# Patient Record
Sex: FEMALE | Race: WHITE | NOT HISPANIC OR LATINO
[De-identification: names, ages, dates, MRNs, and addresses within clinical notes are randomized per-mention and may not be internally consistent; named-entity substitution may affect disease eponyms.]

---

## 2023-06-12 ENCOUNTER — NON-APPOINTMENT (OUTPATIENT)
Age: 53
End: 2023-06-12

## 2023-08-21 PROBLEM — Z00.00 ENCOUNTER FOR PREVENTIVE HEALTH EXAMINATION: Status: ACTIVE | Noted: 2023-08-21

## 2023-08-31 ENCOUNTER — TRANSCRIPTION ENCOUNTER (OUTPATIENT)
Age: 53
End: 2023-08-31

## 2023-08-31 ENCOUNTER — APPOINTMENT (OUTPATIENT)
Dept: NEUROSURGERY | Facility: CLINIC | Age: 53
End: 2023-08-31
Payer: SELF-PAY

## 2023-08-31 DIAGNOSIS — H93.A9 PULSATILE TINNITUS, UNSPECIFIED EAR: ICD-10-CM

## 2023-08-31 PROCEDURE — EDU01: CPT

## 2023-08-31 NOTE — ASSESSMENT
[FreeTextEntry1] : Impression: RIGHT Pulsatile Tinnitus No change with ipsilateral neck pressure No Headaches or Vision Complaints  We discussed possible causes of pulsatile tinnitus including: ENT causes such as semicircular canal dehiscence, tumor, ototoxicity Cardiac causes such as aortic stenosis, valve disease, HTN, other causes of heart murmur Anemia Thyroid disease Cerebrovascular causes such as arteriovenous malformation (AVM), dural arteriovenous fistula (dAVF), venous sinus stenosis, venous aneurysm, large trans-mastoid emissary vein, carotid stenosis, jugular bulb diverticulum   CTA Head and Neck 4/2023 reveals no carotid stenosis, dissection, large aneurysm or AVM  I reassured Ms. Wild that there is no dangerous cerebrovascular cause of of pulsatile tinnitus on her prior imaging.  Based on the sound that she describes, I think there is a low likelihood of finding anything on further evaluation but for reassurance would perform MRA to rule out dural AVF.    Recommendations: MRA Head w/wo TRICKS Protocol Follow Up with Me After Imaging

## 2023-08-31 NOTE — HISTORY OF PRESENT ILLNESS
[de-identified] : Ms. PURCELL is a pleasant 52 year old female who presents today with a chief complaint of RIGHT ear pulsatile tinnitus.  It first started in 2020.  It is described as an intermittent, drum sound that is in sync with her pulse.  Pressing on the side of her neck does not change the sound.   It is not very bothersome but she wants to make sure that there is nothing serious going on.    She denies headaches or vision complaints.

## 2023-08-31 NOTE — REASON FOR VISIT
[Home] : at home, [unfilled] , at the time of the educational consult. [Medical Office: ([facility])___] : at the medical office located in  [Participant] : the participant [Self] : self [New Patient Visit] : a new patient visit [FreeTextEntry1] : Pulsatile Tinnitus